# Patient Record
Sex: FEMALE | Race: WHITE | Employment: OTHER | URBAN - METROPOLITAN AREA
[De-identification: names, ages, dates, MRNs, and addresses within clinical notes are randomized per-mention and may not be internally consistent; named-entity substitution may affect disease eponyms.]

---

## 2017-10-03 PROBLEM — I65.23 OCCLUSION AND STENOSIS OF BILATERAL CAROTID ARTERIES: Status: ACTIVE | Noted: 2017-10-03

## 2017-10-03 PROBLEM — E78.5 HYPERLIPIDEMIA: Status: ACTIVE | Noted: 2017-10-03

## 2018-10-05 PROBLEM — I10 ELEVATED BLOOD PRESSURE READING IN OFFICE WITH DIAGNOSIS OF HYPERTENSION: Status: ACTIVE | Noted: 2018-10-05

## 2023-09-29 ENCOUNTER — INITIAL CONSULT (OUTPATIENT)
Dept: PAIN MANAGEMENT | Age: 68
End: 2023-09-29
Payer: MEDICARE

## 2023-09-29 VITALS — HEIGHT: 63 IN | BODY MASS INDEX: 20.91 KG/M2 | WEIGHT: 118 LBS

## 2023-09-29 DIAGNOSIS — M51.36 LUMBAR DEGENERATIVE DISC DISEASE: ICD-10-CM

## 2023-09-29 DIAGNOSIS — M25.551 BILATERAL HIP PAIN: ICD-10-CM

## 2023-09-29 DIAGNOSIS — M54.16 LUMBAR RADICULOPATHY: Primary | ICD-10-CM

## 2023-09-29 DIAGNOSIS — M25.552 BILATERAL HIP PAIN: ICD-10-CM

## 2023-09-29 DIAGNOSIS — M47.817 LUMBOSACRAL SPONDYLOSIS WITHOUT MYELOPATHY: ICD-10-CM

## 2023-09-29 PROCEDURE — G8427 DOCREV CUR MEDS BY ELIG CLIN: HCPCS | Performed by: STUDENT IN AN ORGANIZED HEALTH CARE EDUCATION/TRAINING PROGRAM

## 2023-09-29 PROCEDURE — 3017F COLORECTAL CA SCREEN DOC REV: CPT | Performed by: STUDENT IN AN ORGANIZED HEALTH CARE EDUCATION/TRAINING PROGRAM

## 2023-09-29 PROCEDURE — 1090F PRES/ABSN URINE INCON ASSESS: CPT | Performed by: STUDENT IN AN ORGANIZED HEALTH CARE EDUCATION/TRAINING PROGRAM

## 2023-09-29 PROCEDURE — 1123F ACP DISCUSS/DSCN MKR DOCD: CPT | Performed by: STUDENT IN AN ORGANIZED HEALTH CARE EDUCATION/TRAINING PROGRAM

## 2023-09-29 PROCEDURE — G8400 PT W/DXA NO RESULTS DOC: HCPCS | Performed by: STUDENT IN AN ORGANIZED HEALTH CARE EDUCATION/TRAINING PROGRAM

## 2023-09-29 PROCEDURE — 1036F TOBACCO NON-USER: CPT | Performed by: STUDENT IN AN ORGANIZED HEALTH CARE EDUCATION/TRAINING PROGRAM

## 2023-09-29 PROCEDURE — 99204 OFFICE O/P NEW MOD 45 MIN: CPT | Performed by: STUDENT IN AN ORGANIZED HEALTH CARE EDUCATION/TRAINING PROGRAM

## 2023-09-29 PROCEDURE — G8420 CALC BMI NORM PARAMETERS: HCPCS | Performed by: STUDENT IN AN ORGANIZED HEALTH CARE EDUCATION/TRAINING PROGRAM

## 2023-09-29 RX ORDER — SIMVASTATIN 20 MG
20 TABLET ORAL NIGHTLY
COMMUNITY
Start: 2023-01-09

## 2023-09-29 RX ORDER — ASPIRIN 81 MG/1
81 TABLET ORAL DAILY
COMMUNITY

## 2023-09-29 RX ORDER — IBUPROFEN 200 MG
200 TABLET ORAL EVERY 6 HOURS PRN
COMMUNITY

## 2023-09-29 RX ORDER — NAPROXEN 375 MG/1
TABLET ORAL
COMMUNITY
Start: 2023-07-10

## 2023-09-29 RX ORDER — LOSARTAN POTASSIUM AND HYDROCHLOROTHIAZIDE 12.5; 5 MG/1; MG/1
TABLET ORAL
COMMUNITY
Start: 2019-03-06

## 2023-09-29 RX ORDER — METOPROLOL SUCCINATE 25 MG/1
TABLET, EXTENDED RELEASE ORAL
COMMUNITY
Start: 2023-08-21

## 2023-09-29 RX ORDER — AMLODIPINE BESYLATE 5 MG/1
TABLET ORAL
COMMUNITY
Start: 2023-09-28

## 2023-09-29 NOTE — PROGRESS NOTES
therapy. Non-opioid and non-pharmacological opportunities to enhance analgesia and quality of life have been and will continue to be pursued.     Rojas Philip DO  Interventional Pain Management/PM&R   [address]    Orders Placed This Encounter    MRI LUMBAR SPINE WO CONTRAST     Standing Status:   Future     Standing Expiration Date:   9/29/2024    XR LUMBAR SPINE FLEXION AND EXTENSION ONLY     Standing Status:   Future     Standing Expiration Date:   9/29/2024     Order Specific Question:   Reason for exam:     Answer:   Lumbar radiculopathy    XR HIP BILATERAL W AP PELVIS (2 VIEWS)     Standing Status:   Future     Standing Expiration Date:   9/29/2024     Order Specific Question:   Reason for exam:     Answer:   hip pain    Calcium Carbonate-Vitamin D 500-1.25 MG-MCG CAPS     Sig: Take 1 tablet by mouth daily    amLODIPine (NORVASC) 5 MG tablet    aspirin 81 MG EC tablet     Sig: Take 1 tablet by mouth daily    ibuprofen (ADVIL;MOTRIN) 200 MG tablet     Sig: Take 1 tablet by mouth every 6 hours as needed    losartan-hydroCHLOROthiazide (HYZAAR) 50-12.5 MG per tablet     Sig: Take by mouth    metoprolol succinate (TOPROL XL) 25 MG extended release tablet    naproxen (NAPROSYN) 375 MG tablet    simvastatin (ZOCOR) 20 MG tablet     Sig: Take 1 tablet by mouth nightly

## 2023-10-19 ENCOUNTER — HOSPITAL ENCOUNTER (OUTPATIENT)
Age: 68
Discharge: HOME | End: 2023-10-21
Payer: MEDICARE

## 2023-10-19 ENCOUNTER — HOSPITAL ENCOUNTER (OUTPATIENT)
Dept: GENERAL RADIOLOGY | Age: 68
Discharge: HOME | End: 2023-10-21
Payer: MEDICARE

## 2023-10-19 ENCOUNTER — HOSPITAL ENCOUNTER (OUTPATIENT)
Dept: MRI IMAGING | Age: 68
Discharge: HOME | End: 2023-10-21
Payer: MEDICARE

## 2023-10-19 DIAGNOSIS — M54.16 LUMBAR RADICULOPATHY: ICD-10-CM

## 2023-10-19 DIAGNOSIS — M25.551 BILATERAL HIP PAIN: ICD-10-CM

## 2023-10-19 DIAGNOSIS — M25.552 BILATERAL HIP PAIN: ICD-10-CM

## 2023-10-19 PROCEDURE — 72120 X-RAY BEND ONLY L-S SPINE: CPT

## 2023-10-19 PROCEDURE — 72148 MRI LUMBAR SPINE W/O DYE: CPT

## 2023-10-19 PROCEDURE — 73521 X-RAY EXAM HIPS BI 2 VIEWS: CPT

## 2023-10-27 ENCOUNTER — OFFICE VISIT (OUTPATIENT)
Dept: PAIN MANAGEMENT | Age: 68
End: 2023-10-27
Payer: MEDICARE

## 2023-10-27 VITALS — BODY MASS INDEX: 20.91 KG/M2 | HEIGHT: 63 IN | WEIGHT: 118 LBS

## 2023-10-27 DIAGNOSIS — M25.552 BILATERAL HIP PAIN: ICD-10-CM

## 2023-10-27 DIAGNOSIS — M54.16 LUMBAR RADICULOPATHY: Primary | ICD-10-CM

## 2023-10-27 DIAGNOSIS — M25.551 BILATERAL HIP PAIN: ICD-10-CM

## 2023-10-27 DIAGNOSIS — M51.36 LUMBAR DEGENERATIVE DISC DISEASE: ICD-10-CM

## 2023-10-27 DIAGNOSIS — M47.817 LUMBOSACRAL SPONDYLOSIS WITHOUT MYELOPATHY: ICD-10-CM

## 2023-10-27 PROCEDURE — G8420 CALC BMI NORM PARAMETERS: HCPCS | Performed by: STUDENT IN AN ORGANIZED HEALTH CARE EDUCATION/TRAINING PROGRAM

## 2023-10-27 PROCEDURE — G8427 DOCREV CUR MEDS BY ELIG CLIN: HCPCS | Performed by: STUDENT IN AN ORGANIZED HEALTH CARE EDUCATION/TRAINING PROGRAM

## 2023-10-27 PROCEDURE — 3017F COLORECTAL CA SCREEN DOC REV: CPT | Performed by: STUDENT IN AN ORGANIZED HEALTH CARE EDUCATION/TRAINING PROGRAM

## 2023-10-27 PROCEDURE — G8400 PT W/DXA NO RESULTS DOC: HCPCS | Performed by: STUDENT IN AN ORGANIZED HEALTH CARE EDUCATION/TRAINING PROGRAM

## 2023-10-27 PROCEDURE — 1123F ACP DISCUSS/DSCN MKR DOCD: CPT | Performed by: STUDENT IN AN ORGANIZED HEALTH CARE EDUCATION/TRAINING PROGRAM

## 2023-10-27 PROCEDURE — G8484 FLU IMMUNIZE NO ADMIN: HCPCS | Performed by: STUDENT IN AN ORGANIZED HEALTH CARE EDUCATION/TRAINING PROGRAM

## 2023-10-27 PROCEDURE — 1090F PRES/ABSN URINE INCON ASSESS: CPT | Performed by: STUDENT IN AN ORGANIZED HEALTH CARE EDUCATION/TRAINING PROGRAM

## 2023-10-27 PROCEDURE — 1036F TOBACCO NON-USER: CPT | Performed by: STUDENT IN AN ORGANIZED HEALTH CARE EDUCATION/TRAINING PROGRAM

## 2023-10-27 PROCEDURE — 99214 OFFICE O/P EST MOD 30 MIN: CPT | Performed by: STUDENT IN AN ORGANIZED HEALTH CARE EDUCATION/TRAINING PROGRAM

## 2023-10-27 NOTE — PROGRESS NOTES
well as activity modification all within the last 6 weeks over 3 months. The patient's pain is moderate to severe that causes functional deficit measured on pain and disability scale. The patient reports worsening quality of life. PLAN:  Medications: For nonopioid therapy, the following medications were prescribed:    -Continue medication prescribed by primary care physician    Opioid therapy:  -Not indicated    Interventions:  -Plan for right lumbar L4 and L5 transforaminal epidural steroid injection  -Hold aspirin 7 days    Imaging:  -Reviewed lumbar x-ray and MRI with patient in room  -Reviewed bilateral hip x-ray with patient in room    Behavioral Therapies:  -Continue daily stretching and home exercise program    Referrals:  -None    Follow-up Plan:  -After procedure    Patient was offered intervention where appropriate. Multi-modal Pain Therapy: The patient was explicitly considered for multimodal and interdisciplinary therapy. Non-opioid and non-pharmacological opportunities to enhance analgesia and quality of life have been and will continue to be pursued.     Braeden Grande DO  Interventional Pain Management/PM&R   Replaced by [facility] [place]    Orders Placed This Encounter    TRANSFORMINAL LUMBAR SINGLE     Standing Status:   Future     Standing Expiration Date:   10/27/2024     Scheduling Instructions:      Right lumbar L4 and L5 transforaminal epidural steroid injection      Hold 7 days    TRANFORMINAL LUMBAR EACH ADDITIONAL     Standing Status:   Future     Standing Expiration Date:   10/27/2024

## 2023-10-27 NOTE — H&P (VIEW-ONLY)
Chronic Pain Clinic Note     Encounter Date: 10/27/2023     SUBJECTIVE:  Chief Complaint   Patient presents with    Back Pain       History of Present Illness:   Gwen Gracia is a 76 y.o. female who presents with Lumbar back pain     Medication Refill: N/A     Current Complaints of Pain:   Location: Lumbar Back Pain    Radiation: Right hip. Right groin   Severity: moderate   Pain Numerical Score - 5   Average: 7     Highest: 10  Lowest: 5  Character/Quality: Complains of pain that is sharp and throbbing  Timing: Keeps awake at night, Increases w/prolonged sitting/standing/walking, Constant  Associated symptoms: weakness   Numbness: na  Weakness: left leg, from previous stroke   Exacerbating factors: prolong walking, sitting, standing   Alleviating factors: advil, pain medication, lido patches   Length of time pain has been present: Started on 2020, had injections done lasted for 2 years   Inciting event/injury: no   Bowel/Bladder incontinence: no  Falls: no  Physical Therapy: 2020 PT    History of Interventions:   Surgery: No previous lumbar/cervical surgeries  Injections: Yes    Imaging:    10/21/23 MRI Lumbar W/O Contrast     FINDINGS:  BONES/ALIGNMENT: Vertebral body heights are maintained. There is a  hemangioma in the L1 vertebral body. There is degenerate endplate change at  C9-7. There is multilevel degenerative disc disease with loss of disc  signal.  There is no spondylolisthesis. SPINAL CORD: The conus terminates normally. SOFT TISSUES: No paraspinal mass identified. L1-L2: There is a circumferential disc bulge. There is no canal stenosis or  foraminal narrowing. L2-L3: There is a circumferential disc bulge. There is no canal stenosis or  foraminal narrowing. L3-L4: There is a circumferential disc bulge. There is no canal stenosis or  foraminal narrowing. L4-L5: There is a circumferential disc bulge with facet hypertrophy. There  is no canal stenosis.   There is moderate

## 2023-11-14 ENCOUNTER — HOSPITAL ENCOUNTER (OUTPATIENT)
Dept: PAIN MANAGEMENT | Facility: CLINIC | Age: 68
Discharge: HOME | End: 2023-11-14
Payer: MEDICARE

## 2023-11-14 VITALS
RESPIRATION RATE: 10 BRPM | WEIGHT: 113 LBS | HEIGHT: 63 IN | OXYGEN SATURATION: 94 % | TEMPERATURE: 97.8 F | SYSTOLIC BLOOD PRESSURE: 132 MMHG | HEART RATE: 73 BPM | BODY MASS INDEX: 20.02 KG/M2 | DIASTOLIC BLOOD PRESSURE: 80 MMHG

## 2023-11-14 DIAGNOSIS — R52 PAIN MANAGEMENT: ICD-10-CM

## 2023-11-14 PROCEDURE — 6360000004 HC RX CONTRAST MEDICATION: Performed by: STUDENT IN AN ORGANIZED HEALTH CARE EDUCATION/TRAINING PROGRAM

## 2023-11-14 PROCEDURE — 2500000003 HC RX 250 WO HCPCS: Performed by: STUDENT IN AN ORGANIZED HEALTH CARE EDUCATION/TRAINING PROGRAM

## 2023-11-14 PROCEDURE — 64484 NJX AA&/STRD TFRM EPI L/S EA: CPT

## 2023-11-14 PROCEDURE — 64484 NJX AA&/STRD TFRM EPI L/S EA: CPT | Performed by: STUDENT IN AN ORGANIZED HEALTH CARE EDUCATION/TRAINING PROGRAM

## 2023-11-14 PROCEDURE — 64483 NJX AA&/STRD TFRM EPI L/S 1: CPT

## 2023-11-14 PROCEDURE — 6360000002 HC RX W HCPCS: Performed by: STUDENT IN AN ORGANIZED HEALTH CARE EDUCATION/TRAINING PROGRAM

## 2023-11-14 PROCEDURE — 99152 MOD SED SAME PHYS/QHP 5/>YRS: CPT | Performed by: STUDENT IN AN ORGANIZED HEALTH CARE EDUCATION/TRAINING PROGRAM

## 2023-11-14 PROCEDURE — 64483 NJX AA&/STRD TFRM EPI L/S 1: CPT | Performed by: STUDENT IN AN ORGANIZED HEALTH CARE EDUCATION/TRAINING PROGRAM

## 2023-11-14 PROCEDURE — 2580000003 HC RX 258: Performed by: STUDENT IN AN ORGANIZED HEALTH CARE EDUCATION/TRAINING PROGRAM

## 2023-11-14 RX ORDER — SODIUM CHLORIDE 0.9 % (FLUSH) 0.9 %
SYRINGE (ML) INJECTION
Status: COMPLETED | OUTPATIENT
Start: 2023-11-14 | End: 2023-11-14

## 2023-11-14 RX ORDER — LIDOCAINE HYDROCHLORIDE 10 MG/ML
INJECTION, SOLUTION EPIDURAL; INFILTRATION; INTRACAUDAL; PERINEURAL
Status: COMPLETED | OUTPATIENT
Start: 2023-11-14 | End: 2023-11-14

## 2023-11-14 RX ORDER — MIDAZOLAM HYDROCHLORIDE 2 MG/2ML
INJECTION, SOLUTION INTRAMUSCULAR; INTRAVENOUS
Status: COMPLETED | OUTPATIENT
Start: 2023-11-14 | End: 2023-11-14

## 2023-11-14 RX ORDER — DEXAMETHASONE SODIUM PHOSPHATE 10 MG/ML
INJECTION, SOLUTION INTRAMUSCULAR; INTRAVENOUS
Status: COMPLETED | OUTPATIENT
Start: 2023-11-14 | End: 2023-11-14

## 2023-11-14 RX ADMIN — LIDOCAINE HYDROCHLORIDE 5 ML: 10 INJECTION, SOLUTION EPIDURAL; INFILTRATION; INTRACAUDAL at 10:06

## 2023-11-14 RX ADMIN — IOHEXOL 3 ML: 180 INJECTION INTRAVENOUS at 10:06

## 2023-11-14 RX ADMIN — DEXAMETHASONE SODIUM PHOSPHATE 20 MG: 10 INJECTION, SOLUTION INTRAMUSCULAR; INTRAVENOUS at 10:08

## 2023-11-14 RX ADMIN — Medication 4 ML: at 10:08

## 2023-11-14 RX ADMIN — MIDAZOLAM HYDROCHLORIDE 2 MG: 1 INJECTION, SOLUTION INTRAMUSCULAR; INTRAVENOUS at 10:06

## 2023-11-14 ASSESSMENT — PAIN SCALES - GENERAL
PAINLEVEL_OUTOF10: 0

## 2023-11-14 ASSESSMENT — PAIN DESCRIPTION - DESCRIPTORS: DESCRIPTORS: SHARP

## 2023-11-14 ASSESSMENT — PAIN - FUNCTIONAL ASSESSMENT: PAIN_FUNCTIONAL_ASSESSMENT: 0-10

## 2023-11-14 NOTE — OP NOTE
PROCEDURE PERFORMED: Right Lumbar Transforaminal Epidural Steroid Injection using Fluoroscopy    PREOPERATIVE DIAGNOSIS: Right lumbosacral radiculopathy    INDICATIONS: Right radicular leg pain    The patient's history and physical exam were reviewed. The risk, benefits, and alternatives of the procedure were discussed and all questions were answered to the patient's satisfaction. The patient agreed to proceed and written informed consent was obtained. POSTOPERATIVE DIAGNOSIS: Same    PHYSICIAN:  Dr. Dane Guerrero DO    ANESTHESIA:  LOCAL    ASSISTANT:  NONE    PATHOLOGY:  NONE    ESTIMATED BLOOD LOSS:  N/A    IMPLANTS:  NONE    PROCEDURE DESCRIPTION: Right lumbar transforaminal epidural injection using fluoroscopy    Targeted levels: Right lumbar L4 and L5 transforaminal epidural space    The patient was placed on the operative bed in prone position. The area was prepped with  Chlorhexidine. The area was then draped in a sterile fashion. An AP fluoroscopic  film was taken to identify the L5 vertebral body, pedicle and superior articulating process. The skin and subcutaneous tissues were anesthetized with 1% lidocaine. Then a 22-gauge 3-1/2 inch Quincke spinal needle was advanced under fluoroscopic guidance using an oblique view just inferior to the pedicle at L5. Then utilizing AP and lateral fluoroscopic views, I confirmed the position of the needle tip to be within the neural foramen. After negative aspiration, Omnipaque was injected under AP view at each level. There was adequate contrast spread along the nerve root and in the epidural space. There was no evidence of intravascular uptake or intrathecal spread in imaging. At this point, after negative aspiration, a total volume of treatment injectate consisting of 1 mL of dexamethasone 10 mg/mL and 2 mL of normal saline preservative-free was easily injected. The needle was then removed.   There was applied pressure to the needle insertion

## 2023-11-14 NOTE — DISCHARGE INSTRUCTIONS

## 2023-12-01 ENCOUNTER — OFFICE VISIT (OUTPATIENT)
Dept: PAIN MANAGEMENT | Age: 68
End: 2023-12-01

## 2023-12-01 VITALS — WEIGHT: 113 LBS | HEIGHT: 63 IN | BODY MASS INDEX: 20.02 KG/M2

## 2023-12-01 DIAGNOSIS — M70.61 GREATER TROCHANTERIC BURSITIS OF RIGHT HIP: Primary | ICD-10-CM

## 2023-12-01 DIAGNOSIS — M47.817 LUMBOSACRAL SPONDYLOSIS WITHOUT MYELOPATHY: ICD-10-CM

## 2023-12-01 DIAGNOSIS — M51.36 LUMBAR DEGENERATIVE DISC DISEASE: ICD-10-CM

## 2023-12-01 DIAGNOSIS — M54.16 LUMBAR RADICULOPATHY: ICD-10-CM

## 2023-12-01 RX ORDER — BUPIVACAINE HYDROCHLORIDE 2.5 MG/ML
4 INJECTION, SOLUTION INFILTRATION; PERINEURAL ONCE
Status: COMPLETED | OUTPATIENT
Start: 2023-12-01 | End: 2023-12-01

## 2023-12-01 RX ORDER — TRIAMCINOLONE ACETONIDE 40 MG/ML
40 INJECTION, SUSPENSION INTRA-ARTICULAR; INTRAMUSCULAR ONCE
Status: COMPLETED | OUTPATIENT
Start: 2023-12-01 | End: 2023-12-01

## 2023-12-01 RX ADMIN — BUPIVACAINE HYDROCHLORIDE 4 ML: 2.5 INJECTION, SOLUTION INFILTRATION; PERINEURAL at 12:27

## 2023-12-01 RX ADMIN — TRIAMCINOLONE ACETONIDE 40 MG: 40 INJECTION, SUSPENSION INTRA-ARTICULAR; INTRAMUSCULAR at 12:28

## 2023-12-01 NOTE — PROGRESS NOTES
Chronic Pain Clinic Note     Encounter Date: 12/1/2023     SUBJECTIVE:  No chief complaint on file. History of Present Illness:   Maribel Salvador is a 76 y.o. female who presents with Lumbar back pain     Medication Refill: N/A     Current Complaints of Pain:   Location: Lumbar Back Pain    Radiation: Right hip. Right groin   Severity: moderate   Pain Numerical Score - 5   Average: 7     Highest: 10  Lowest: 5  Character/Quality: Complains of pain that is sharp and throbbing  Timing: Keeps awake at night, Increases w/prolonged sitting/standing/walking, Constant  Associated symptoms: weakness   Numbness: na  Weakness: left leg, from previous stroke   Exacerbating factors: prolong walking, sitting, standing   Alleviating factors: advil, pain medication, lido patches   Length of time pain has been present: Started on 2020, had injections done lasted for 2 years   Inciting event/injury: no   Bowel/Bladder incontinence: no  Falls: no  Physical Therapy: 2020 PT    History of Interventions:   Surgery: No previous lumbar/cervical surgeries  Injections: Yes    Imaging:    10/21/23 MRI Lumbar W/O Contrast     FINDINGS:  BONES/ALIGNMENT: Vertebral body heights are maintained. There is a  hemangioma in the L1 vertebral body. There is degenerate endplate change at  V2-0. There is multilevel degenerative disc disease with loss of disc  signal.  There is no spondylolisthesis. SPINAL CORD: The conus terminates normally. SOFT TISSUES: No paraspinal mass identified. L1-L2: There is a circumferential disc bulge. There is no canal stenosis or  foraminal narrowing. L2-L3: There is a circumferential disc bulge. There is no canal stenosis or  foraminal narrowing. L3-L4: There is a circumferential disc bulge. There is no canal stenosis or  foraminal narrowing. L4-L5: There is a circumferential disc bulge with facet hypertrophy. There  is no canal stenosis. There is moderate foraminal narrowing.      L5-S1:

## 2023-12-01 NOTE — PROGRESS NOTES
Procedure performed: Right greater trochanteric bursa injections    Indications: Right lateral hip pain    Risks, benefits, and alternatives of the procedure were reviewed. All questions were answered appropriately and informed consent, both written and verbal, was obtained. The patient was placed in the standing position. A timeout was obtained. Landmarks were palpated. The lateral hip was palpated until the greater trochanter was marked. The area was prepped with alcohol x3 in typical aseptic fashion. At this point, a 25-gauge 3.5\" Quincke needle was inserted into the lateral hip until bone was contacted and then slightly withdrawn. After negative aspiration, A total of 4 mL of bupivacaine 0.25% combined with 40 mg of Kenalog was slowly injected into the lateral hip. The needle was withdrawn with the point of needle intact. The procedure was tolerated without sequelae. The patient was kept in the office for approximately 10 minutes and left without signs of any immediate sequelae.

## 2024-03-22 ENCOUNTER — OFFICE VISIT (OUTPATIENT)
Dept: PAIN MANAGEMENT | Age: 69
End: 2024-03-22
Payer: MEDICARE

## 2024-03-22 VITALS — BODY MASS INDEX: 20.02 KG/M2 | WEIGHT: 113 LBS | HEIGHT: 63 IN

## 2024-03-22 DIAGNOSIS — M47.817 LUMBOSACRAL SPONDYLOSIS WITHOUT MYELOPATHY: ICD-10-CM

## 2024-03-22 DIAGNOSIS — M54.16 LUMBAR RADICULOPATHY: Primary | ICD-10-CM

## 2024-03-22 DIAGNOSIS — M51.36 LUMBAR DEGENERATIVE DISC DISEASE: ICD-10-CM

## 2024-03-22 DIAGNOSIS — M70.61 GREATER TROCHANTERIC BURSITIS OF RIGHT HIP: ICD-10-CM

## 2024-03-22 PROCEDURE — 1036F TOBACCO NON-USER: CPT | Performed by: STUDENT IN AN ORGANIZED HEALTH CARE EDUCATION/TRAINING PROGRAM

## 2024-03-22 PROCEDURE — 1090F PRES/ABSN URINE INCON ASSESS: CPT | Performed by: STUDENT IN AN ORGANIZED HEALTH CARE EDUCATION/TRAINING PROGRAM

## 2024-03-22 PROCEDURE — 1123F ACP DISCUSS/DSCN MKR DOCD: CPT | Performed by: STUDENT IN AN ORGANIZED HEALTH CARE EDUCATION/TRAINING PROGRAM

## 2024-03-22 PROCEDURE — G8427 DOCREV CUR MEDS BY ELIG CLIN: HCPCS | Performed by: STUDENT IN AN ORGANIZED HEALTH CARE EDUCATION/TRAINING PROGRAM

## 2024-03-22 PROCEDURE — 99214 OFFICE O/P EST MOD 30 MIN: CPT | Performed by: STUDENT IN AN ORGANIZED HEALTH CARE EDUCATION/TRAINING PROGRAM

## 2024-03-22 PROCEDURE — G8420 CALC BMI NORM PARAMETERS: HCPCS | Performed by: STUDENT IN AN ORGANIZED HEALTH CARE EDUCATION/TRAINING PROGRAM

## 2024-03-22 PROCEDURE — 3017F COLORECTAL CA SCREEN DOC REV: CPT | Performed by: STUDENT IN AN ORGANIZED HEALTH CARE EDUCATION/TRAINING PROGRAM

## 2024-03-22 PROCEDURE — G8400 PT W/DXA NO RESULTS DOC: HCPCS | Performed by: STUDENT IN AN ORGANIZED HEALTH CARE EDUCATION/TRAINING PROGRAM

## 2024-03-22 PROCEDURE — G8484 FLU IMMUNIZE NO ADMIN: HCPCS | Performed by: STUDENT IN AN ORGANIZED HEALTH CARE EDUCATION/TRAINING PROGRAM

## 2024-03-22 RX ORDER — METHYLPREDNISOLONE 4 MG/1
TABLET ORAL
Qty: 1 KIT | Refills: 0 | Status: SHIPPED | OUTPATIENT
Start: 2024-03-22

## 2024-03-22 NOTE — H&P (VIEW-ONLY)
Chronic Pain Clinic Note     Encounter Date: 3/22/2024     SUBJECTIVE:  Chief Complaint   Patient presents with    Back Pain       History of Present Illness:   Kalee Padilla is a 68 y.o. female who presents with low back pain    Medication Refill: N/A     Current Complaints of Pain:   Location: Low Back Pain    Radiation: Right leg  Severity: moderate   Pain Numerical Score - 8   Average: 7     Highest: 10  Lowest: 5  Character/Quality: Complains of pain that is sharp and throbbing  Timing: Keeps awake at night, Increases w/prolonged sitting/standing/walking, Constant  Associated symptoms: weakness   Numbness: na  Weakness: left leg, from previous stroke   Exacerbating factors: prolong walking, sitting, standing   Alleviating factors: advil, pain medication, lido patches   Length of time pain has been present: Started on 2020, had injections done lasted for 2 years   Inciting event/injury: no   Bowel/Bladder incontinence: no  Falls: no  Physical Therapy: 2020 PT    History of Interventions:   Surgery: No previous lumbar/cervical surgeries  Injections: Yes    Imaging:    10/21/23 MRI Lumbar W/O Contrast     FINDINGS:  BONES/ALIGNMENT: Vertebral body heights are maintained.  There is a  hemangioma in the L1 vertebral body.  There is degenerate endplate change at  L4-5.  There is multilevel degenerative disc disease with loss of disc  signal.  There is no spondylolisthesis.     SPINAL CORD: The conus terminates normally.     SOFT TISSUES: No paraspinal mass identified.     L1-L2: There is a circumferential disc bulge.  There is no canal stenosis or  foraminal narrowing.     L2-L3: There is a circumferential disc bulge.  There is no canal stenosis or  foraminal narrowing.     L3-L4: There is a circumferential disc bulge.  There is no canal stenosis or  foraminal narrowing.     L4-L5: There is a circumferential disc bulge with facet hypertrophy.  There  is no canal stenosis.  There is moderate foraminal narrowing.

## 2024-03-22 NOTE — PROGRESS NOTES
failed conservative measures including outpatient physical therapy, greater than 3 medications for pain relief, a self-directed therapy program, as well as activity modification all within the last 6 weeks over 3 months. The patient's pain is moderate to severe that causes functional deficit measured on pain and disability scale. The patient reports worsening quality of life.    PLAN:  Medications:    For nonopioid therapy, the following medications were prescribed:    -Continue medication prescribed by primary care physician    Opioid therapy:  -Not indicated    Interventions:  -Plan for right lumbar L4 and L5 transforaminal epidural steroid injection  -Status post right greater trochanteric bursa injection on 12/1/2023   -Status post right lumbar L4 and L5 transforaminal epidural steroid injection on 11/14/2023 with 50% improvement in pain and function  -Hold aspirin 7 days    Imaging:  -No new imaging    Behavioral Therapies:  -Continue daily stretching and home exercise program    Referrals:  -None    Follow-up Plan:  -After procedure    Patient was offered intervention where appropriate.     Multi-modal Pain Therapy:  The patient was explicitly considered for multimodal and interdisciplinary therapy. Non-opioid and non-pharmacological opportunities to enhance analgesia and quality of life have been and will continue to be pursued.    Phil Caba DO  Interventional Pain Management/PM&R   [facility]    Orders Placed This Encounter    TRANSFORMINAL LUMBAR SINGLE     Standing Status:   Future     Standing Expiration Date:   3/22/2025     Scheduling Instructions:      Right lumbar L4 and L5 tansforaminal epidural steroid injection      Hold ASA 7 days    TRANFORMINAL LUMBAR EACH ADDITIONAL     Standing Status:   Future     Standing Expiration Date:   3/22/2025    methylPREDNISolone (MEDROL DOSEPACK) 4 MG tablet     Sig: Take by mouth.     Dispense:  1 kit     Refill:  0

## 2024-04-02 ENCOUNTER — HOSPITAL ENCOUNTER (OUTPATIENT)
Dept: PAIN MANAGEMENT | Facility: CLINIC | Age: 69
Discharge: HOME | End: 2024-04-02
Payer: MEDICARE

## 2024-04-02 VITALS
BODY MASS INDEX: 20.02 KG/M2 | HEIGHT: 63 IN | RESPIRATION RATE: 10 BRPM | SYSTOLIC BLOOD PRESSURE: 121 MMHG | TEMPERATURE: 98.4 F | DIASTOLIC BLOOD PRESSURE: 76 MMHG | WEIGHT: 113 LBS | OXYGEN SATURATION: 97 % | HEART RATE: 67 BPM

## 2024-04-02 DIAGNOSIS — R52 PAIN MANAGEMENT: ICD-10-CM

## 2024-04-02 PROCEDURE — 2580000003 HC RX 258: Performed by: STUDENT IN AN ORGANIZED HEALTH CARE EDUCATION/TRAINING PROGRAM

## 2024-04-02 PROCEDURE — 64483 NJX AA&/STRD TFRM EPI L/S 1: CPT | Performed by: STUDENT IN AN ORGANIZED HEALTH CARE EDUCATION/TRAINING PROGRAM

## 2024-04-02 PROCEDURE — 99152 MOD SED SAME PHYS/QHP 5/>YRS: CPT | Performed by: STUDENT IN AN ORGANIZED HEALTH CARE EDUCATION/TRAINING PROGRAM

## 2024-04-02 PROCEDURE — 64483 NJX AA&/STRD TFRM EPI L/S 1: CPT

## 2024-04-02 PROCEDURE — 64484 NJX AA&/STRD TFRM EPI L/S EA: CPT

## 2024-04-02 PROCEDURE — 64484 NJX AA&/STRD TFRM EPI L/S EA: CPT | Performed by: STUDENT IN AN ORGANIZED HEALTH CARE EDUCATION/TRAINING PROGRAM

## 2024-04-02 PROCEDURE — 6360000002 HC RX W HCPCS: Performed by: STUDENT IN AN ORGANIZED HEALTH CARE EDUCATION/TRAINING PROGRAM

## 2024-04-02 PROCEDURE — 6360000004 HC RX CONTRAST MEDICATION: Performed by: STUDENT IN AN ORGANIZED HEALTH CARE EDUCATION/TRAINING PROGRAM

## 2024-04-02 PROCEDURE — 2500000003 HC RX 250 WO HCPCS: Performed by: STUDENT IN AN ORGANIZED HEALTH CARE EDUCATION/TRAINING PROGRAM

## 2024-04-02 RX ORDER — SODIUM CHLORIDE 0.9 % (FLUSH) 0.9 %
SYRINGE (ML) INJECTION
Status: COMPLETED | OUTPATIENT
Start: 2024-04-02 | End: 2024-04-02

## 2024-04-02 RX ORDER — DEXAMETHASONE SODIUM PHOSPHATE 10 MG/ML
INJECTION, SOLUTION INTRAMUSCULAR; INTRAVENOUS
Status: COMPLETED | OUTPATIENT
Start: 2024-04-02 | End: 2024-04-02

## 2024-04-02 RX ORDER — LIDOCAINE HYDROCHLORIDE 10 MG/ML
INJECTION, SOLUTION EPIDURAL; INFILTRATION; INTRACAUDAL; PERINEURAL
Status: COMPLETED | OUTPATIENT
Start: 2024-04-02 | End: 2024-04-02

## 2024-04-02 RX ORDER — MIDAZOLAM HYDROCHLORIDE 2 MG/2ML
INJECTION, SOLUTION INTRAMUSCULAR; INTRAVENOUS
Status: COMPLETED | OUTPATIENT
Start: 2024-04-02 | End: 2024-04-02

## 2024-04-02 RX ADMIN — LIDOCAINE HYDROCHLORIDE 5 ML: 10 INJECTION, SOLUTION EPIDURAL; INFILTRATION; INTRACAUDAL at 11:56

## 2024-04-02 RX ADMIN — Medication 4 ML: at 11:55

## 2024-04-02 RX ADMIN — MIDAZOLAM HYDROCHLORIDE 2 MG: 1 INJECTION, SOLUTION INTRAMUSCULAR; INTRAVENOUS at 11:53

## 2024-04-02 RX ADMIN — IOHEXOL 1 ML: 180 INJECTION INTRAVENOUS at 11:54

## 2024-04-02 RX ADMIN — DEXAMETHASONE SODIUM PHOSPHATE 20 MG: 10 INJECTION, SOLUTION INTRAMUSCULAR; INTRAVENOUS at 11:55

## 2024-04-02 RX ADMIN — IOHEXOL 1 ML: 180 INJECTION INTRAVENOUS at 11:56

## 2024-04-02 ASSESSMENT — PAIN DESCRIPTION - DESCRIPTORS: DESCRIPTORS: SHARP

## 2024-04-02 ASSESSMENT — PAIN - FUNCTIONAL ASSESSMENT
PAIN_FUNCTIONAL_ASSESSMENT: 0-10
PAIN_FUNCTIONAL_ASSESSMENT: 0-10
PAIN_FUNCTIONAL_ASSESSMENT: PREVENTS OR INTERFERES WITH ALL ACTIVE AND SOME PASSIVE ACTIVITIES

## 2024-04-02 NOTE — INTERVAL H&P NOTE
Update History & Physical    The patient's History and Physical of March 22, 2024 was reviewed with the patient and I examined the patient. There was no change. The surgical site was confirmed by the patient and me.     Plan: The risks, benefits, expected outcome, and alternative to the recommended procedure have been discussed with the patient. Patient understands and wants to proceed with the procedure.     ASA CLASSIFICATION  2  MP   CLASSIFICATION  2    Electronically signed by Phil Caba DO on 4/2/2024 at 11:39 AM

## 2024-04-02 NOTE — DISCHARGE INSTRUCTIONS

## 2024-04-02 NOTE — OP NOTE
PROCEDURE PERFORMED: Right Lumbar Transforaminal Epidural Steroid Injection using Fluoroscopy    PREOPERATIVE DIAGNOSIS: Right lumbosacral radiculopathy    INDICATIONS: Right radicular leg pain    The patient's history and physical exam were reviewed.  The risk, benefits, and alternatives of the procedure were discussed and all questions were answered to the patient's satisfaction.  The patient agreed to proceed and written informed consent was obtained.    POSTOPERATIVE DIAGNOSIS: Same    PHYSICIAN:  Dr. Phil Caba DO    ANESTHESIA:  LOCAL    ASSISTANT:  NONE    PATHOLOGY:  NONE    ESTIMATED BLOOD LOSS:  N/A    IMPLANTS:  NONE    PROCEDURE DESCRIPTION: Right lumbar transforaminal epidural injection using fluoroscopy    Targeted levels: Right lumbar L4 and L5 transforaminal epidural space    The patient was placed on the operative bed in prone position.  The area was prepped with  Chlorhexidine.  The area was then draped in a sterile fashion.  An AP fluoroscopic  film was taken to identify the L5 vertebral body, pedicle and superior articulating process.  The skin and subcutaneous tissues were anesthetized with 1% lidocaine.  Then a 22-gauge 3-1/2 inch Quincke spinal needle was advanced under fluoroscopic guidance using an oblique view just inferior to the pedicle at L5.  Then utilizing AP and lateral fluoroscopic views, I confirmed the position of the needle tip to be within the neural foramen.  After negative aspiration, Omnipaque was injected under AP view at each level.  There was adequate contrast spread along the nerve root and in the epidural space.  There was no evidence of intravascular uptake or intrathecal spread in imaging.  At this point, after negative aspiration, a total volume of treatment injectate consisting of 1 mL of dexamethasone 10 mg/mL and 2 mL of normal saline preservative-free was easily injected.  The needle was then removed.  There was applied pressure to the needle insertion

## 2024-04-08 ENCOUNTER — TELEPHONE (OUTPATIENT)
Dept: PAIN MANAGEMENT | Age: 69
End: 2024-04-08

## 2024-04-08 NOTE — TELEPHONE ENCOUNTER
Patient called in regards to back pain since Friday. Patient had a Right Lumbar Transforaminal Epidural Steroid Injection done on 4/2/2024. Patient states when they walk, sit or stand pain increases in the back and right hip, patient has been putting ice and taking advil every 4 hours to help with the pain, and it is not helping. Please advise.

## 2024-04-26 ENCOUNTER — OFFICE VISIT (OUTPATIENT)
Dept: PAIN MANAGEMENT | Age: 69
End: 2024-04-26
Payer: MEDICARE

## 2024-04-26 VITALS — BODY MASS INDEX: 20.02 KG/M2 | WEIGHT: 113 LBS | HEIGHT: 63 IN

## 2024-04-26 DIAGNOSIS — M25.552 BILATERAL HIP PAIN: ICD-10-CM

## 2024-04-26 DIAGNOSIS — M47.817 LUMBOSACRAL SPONDYLOSIS WITHOUT MYELOPATHY: ICD-10-CM

## 2024-04-26 DIAGNOSIS — M54.16 LUMBAR RADICULOPATHY: Primary | ICD-10-CM

## 2024-04-26 DIAGNOSIS — M25.551 BILATERAL HIP PAIN: ICD-10-CM

## 2024-04-26 DIAGNOSIS — M51.36 LUMBAR DEGENERATIVE DISC DISEASE: ICD-10-CM

## 2024-04-26 PROCEDURE — 1090F PRES/ABSN URINE INCON ASSESS: CPT | Performed by: STUDENT IN AN ORGANIZED HEALTH CARE EDUCATION/TRAINING PROGRAM

## 2024-04-26 PROCEDURE — G8420 CALC BMI NORM PARAMETERS: HCPCS | Performed by: STUDENT IN AN ORGANIZED HEALTH CARE EDUCATION/TRAINING PROGRAM

## 2024-04-26 PROCEDURE — 1036F TOBACCO NON-USER: CPT | Performed by: STUDENT IN AN ORGANIZED HEALTH CARE EDUCATION/TRAINING PROGRAM

## 2024-04-26 PROCEDURE — 99213 OFFICE O/P EST LOW 20 MIN: CPT | Performed by: STUDENT IN AN ORGANIZED HEALTH CARE EDUCATION/TRAINING PROGRAM

## 2024-04-26 PROCEDURE — G8400 PT W/DXA NO RESULTS DOC: HCPCS | Performed by: STUDENT IN AN ORGANIZED HEALTH CARE EDUCATION/TRAINING PROGRAM

## 2024-04-26 PROCEDURE — 3017F COLORECTAL CA SCREEN DOC REV: CPT | Performed by: STUDENT IN AN ORGANIZED HEALTH CARE EDUCATION/TRAINING PROGRAM

## 2024-04-26 PROCEDURE — 1123F ACP DISCUSS/DSCN MKR DOCD: CPT | Performed by: STUDENT IN AN ORGANIZED HEALTH CARE EDUCATION/TRAINING PROGRAM

## 2024-04-26 PROCEDURE — G8427 DOCREV CUR MEDS BY ELIG CLIN: HCPCS | Performed by: STUDENT IN AN ORGANIZED HEALTH CARE EDUCATION/TRAINING PROGRAM

## 2024-04-26 NOTE — PROGRESS NOTES
Chronic Pain Clinic Note     Encounter Date: 4/26/2024     SUBJECTIVE:  Chief Complaint   Patient presents with    Back Pain       History of Present Illness:   Kalee Padilla is a 68 y.o. female who presents with low back pain    Medication Refill: N/A     Current Complaints of Pain:   Location: Low Back Pain    Radiation: Right leg  Severity: moderate   Pain Numerical Score - 9 today    Average: 7     Highest: 10  Lowest: 5  Character/Quality: Complains of pain that is sharp and throbbing  Timing: Keeps awake at night, Increases w/prolonged sitting/standing/walking, Constant  Associated symptoms: weakness   Numbness: na  Weakness: left leg, from previous stroke   Exacerbating factors: prolong walking, sitting, standing   Alleviating factors: advil, pain medication, lido patches   Length of time pain has been present: Started on 2020, had injections done lasted for 2 years   Inciting event/injury: no   Bowel/Bladder incontinence: no  Falls: no  Physical Therapy: 2020 PT    History of Interventions:   Surgery: No previous lumbar/cervical surgeries  Injections: TF LESI 04/02/2024 - pt states 1% pain relief    Imaging:    10/21/23 MRI Lumbar W/O Contrast     FINDINGS:  BONES/ALIGNMENT: Vertebral body heights are maintained.  There is a  hemangioma in the L1 vertebral body.  There is degenerate endplate change at  L4-5.  There is multilevel degenerative disc disease with loss of disc  signal.  There is no spondylolisthesis.     SPINAL CORD: The conus terminates normally.     SOFT TISSUES: No paraspinal mass identified.     L1-L2: There is a circumferential disc bulge.  There is no canal stenosis or  foraminal narrowing.     L2-L3: There is a circumferential disc bulge.  There is no canal stenosis or  foraminal narrowing.     L3-L4: There is a circumferential disc bulge.  There is no canal stenosis or  foraminal narrowing.     L4-L5: There is a circumferential disc bulge with facet hypertrophy.  There  is no canal

## 2025-05-30 ENCOUNTER — HOSPITAL ENCOUNTER (EMERGENCY)
Facility: CLINIC | Age: 70
Discharge: HOME | End: 2025-05-30
Attending: EMERGENCY MEDICINE
Payer: MEDICARE

## 2025-05-30 ENCOUNTER — APPOINTMENT (OUTPATIENT)
Dept: GENERAL RADIOLOGY | Facility: CLINIC | Age: 70
End: 2025-05-30
Payer: MEDICARE

## 2025-05-30 VITALS
HEIGHT: 63 IN | HEART RATE: 75 BPM | SYSTOLIC BLOOD PRESSURE: 174 MMHG | DIASTOLIC BLOOD PRESSURE: 114 MMHG | WEIGHT: 120 LBS | TEMPERATURE: 98.1 F | BODY MASS INDEX: 21.26 KG/M2 | OXYGEN SATURATION: 96 % | RESPIRATION RATE: 18 BRPM

## 2025-05-30 DIAGNOSIS — S80.211A ABRASION OF RIGHT KNEE, INITIAL ENCOUNTER: ICD-10-CM
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
## 2025-05-30 DIAGNOSIS — M70.61 TROCHANTERIC BURSITIS OF RIGHT HIP: ICD-10-CM

## 2025-05-30 DIAGNOSIS — M54.50 LOW BACK PAIN, UNSPECIFIED BACK PAIN LATERALITY, UNSPECIFIED CHRONICITY, UNSPECIFIED WHETHER SCIATICA PRESENT: ICD-10-CM

## 2025-05-30 PROCEDURE — 6370000000 HC RX 637 (ALT 250 FOR IP)

## 2025-05-30 PROCEDURE — 73502 X-RAY EXAM HIP UNI 2-3 VIEWS: CPT

## 2025-05-30 PROCEDURE — 72100 X-RAY EXAM L-S SPINE 2/3 VWS: CPT

## 2025-05-30 PROCEDURE — 99283 EMERGENCY DEPT VISIT LOW MDM: CPT

## 2025-05-30 RX ORDER — ROSUVASTATIN CALCIUM 5 MG/1
5 TABLET, COATED ORAL DAILY
COMMUNITY

## 2025-05-30 RX ORDER — TRAMADOL HYDROCHLORIDE 50 MG/1
50 TABLET ORAL EVERY 8 HOURS PRN
Qty: 15 TABLET | Refills: 0 | Status: SHIPPED | OUTPATIENT
Start: 2025-05-30 | End: 2025-06-04

## 2025-05-30 RX ORDER — HYDROCODONE BITARTRATE AND ACETAMINOPHEN 5; 325 MG/1; MG/1
1 TABLET ORAL ONCE
Status: COMPLETED | OUTPATIENT
Start: 2025-05-30 | End: 2025-05-30

## 2025-05-30 RX ORDER — HYDROCODONE BITARTRATE AND ACETAMINOPHEN 5; 325 MG/1; MG/1
TABLET ORAL
Status: DISCONTINUED
Start: 2025-05-30 | End: 2025-05-30 | Stop reason: HOSPADM

## 2025-05-30 RX ORDER — LIDOCAINE 4 G/G
PATCH TOPICAL
Status: DISCONTINUED
Start: 2025-05-30 | End: 2025-05-30 | Stop reason: HOSPADM

## 2025-05-30 RX ORDER — MELOXICAM 15 MG/1
15 TABLET ORAL DAILY
COMMUNITY

## 2025-05-30 RX ORDER — LIDOCAINE 4 G/G
1 PATCH TOPICAL ONCE
Status: DISCONTINUED | OUTPATIENT
Start: 2025-05-30 | End: 2025-05-30

## 2025-05-30 RX ADMIN — HYDROCODONE BITARTRATE AND ACETAMINOPHEN 1 TABLET: 5; 325 TABLET ORAL at 11:53

## 2025-05-30 ASSESSMENT — ENCOUNTER SYMPTOMS
VOMITING: 0
ABDOMINAL PAIN: 0
COLOR CHANGE: 0
SHORTNESS OF BREATH: 0
PHOTOPHOBIA: 0
BACK PAIN: 1
NAUSEA: 0
DIARRHEA: 0

## 2025-05-30 ASSESSMENT — PAIN DESCRIPTION - ORIENTATION: ORIENTATION: RIGHT

## 2025-05-30 ASSESSMENT — PAIN - FUNCTIONAL ASSESSMENT: PAIN_FUNCTIONAL_ASSESSMENT: 0-10

## 2025-05-30 ASSESSMENT — PAIN SCALES - GENERAL: PAINLEVEL_OUTOF10: 9

## 2025-05-30 ASSESSMENT — PAIN DESCRIPTION - LOCATION: LOCATION: HIP;BACK;KNEE

## 2025-05-30 ASSESSMENT — PAIN DESCRIPTION - DESCRIPTORS: DESCRIPTORS: SHARP;SHOOTING;ACHING

## 2025-05-30 ASSESSMENT — PAIN DESCRIPTION - PAIN TYPE: TYPE: ACUTE PAIN

## 2025-05-30 ASSESSMENT — PAIN DESCRIPTION - FREQUENCY: FREQUENCY: CONTINUOUS

## 2025-05-30 NOTE — DISCHARGE INSTRUCTIONS
You were seen today for back pain and hip pain.    Continue meloxicam as prescribed by your primary care doctor.    Take tylenol 650 mg- 1,000 mg every 6-8 hours as needed.    You may also take tramadol as needed for severe pain.  Please note that this may make you drowsy, so please do not drive or operate heavy machinery until you know how this medication may affect you.     Apply diclofenac gel to affected area up to four times daily as needed for pain.    It is important with lower back pain that you continue with daily activities.  Bed rest can worsen symptoms and lead to long term complications.  Make sure to stretch daily.  Avoid lifting heavy items > 20 pounds until pain improves.  Wearing a back brace in the future may help prevent future injuries.     Please call Ubaldo Flores MD on the next business day for follow-up within 1 week.     Go to your scheduled appointment with orthopedic surgery/specialist on June 3rd, Tuesday, at 9:30 am for further evaluation and treatment recommendations.    Return to the ED if you develop severe worsening of pain, inability to walk, inability to control bowel or bladder, high fevers, or any other concerns arise.

## 2025-05-30 NOTE — ED PROVIDER NOTES
[facility]  [address] [address]  Phone: [phone]        Pt Name: Kalee Padilla  MRN: [id number]  Birthdate 1955  Date of evaluation: 5/30/25    CHIEFCOMPLAINT       Chief Complaint   Patient presents with    Fall     Right lower back, right hip and right knee pain, tripped on some bricks yesterday at 4P, no hitting, no LOC       HISTORY OF PRESENT ILLNESS (Location/Symptom, Timing/Onset, Context/Setting, Quality, Duration, Modifying Factors, Severity)      Kalee Padilla is a 69 y.o. female with no pertinent PMH who presents to the ED via private auto with complaint of a misstep on her outdoor stairs yesterday evening around 4 PM.  Patient states she came down on her right knee and she felt like her right leg jammed into her right hip which she currently is treating for trochanteric bursitis.  Patient is under treatment with her primary care physician for this.  Takes Mobic for her trochanteric bursitis.  She did not hit her head, no LOC.  Had severe pain overnight, Tylenol and Mobic not helping.  No numbness, tingling or weakness to her arms or legs.  No loss of bowel or bladder control.  No hematuria.  Pain worse with bearing weight and ambulation.  9 out of 10.  No previous injury or surgery to her right hip.  States that she was told by her PCP that she had \"bone-on-bone and would likely need a hip replacement.    PAST MEDICAL / SURGICAL / SOCIAL / FAMILY HISTORY     PMH:  has a past medical history of Arthritis, CAD (coronary artery disease), GERD (gastroesophageal reflux disease), Hypertension, Pancreatitis, Pulmonary hypertension (HCC), Spastic colon, and Wears glasses.  Surgical History:  has a past surgical history that includes Cholecystectomy; Upper gastrointestinal endoscopy; Colonoscopy; Foot surgery (Bilateral, x 4 each foot); and Pain management procedure.  Social History:  reports that she has never smoked. She has never used smokeless tobacco. She

## 2025-05-30 NOTE — ED NOTES
Mode of arrival (squad #, walk in, police, etc) : walk in, from home        Chief complaint(s): fall, right lower back, hip and knee pain        Arrival Note (brief scenario, treatment PTA, etc).: Pt presented to the ED c/o right lower back, hip and knee pain after tripping and falling on the concrete yesterday around 1600. Denies hitting head, denies LOC. Reports she has been taking meloxicam and tylenol at home and elevating her right extremity. Her last dose of 1 g tylenol was this morning around 0700. Pt alert and oriented, having unsteady gait.         C= \"Have you ever felt that you should Cut down on your drinking?\"  No  A= \"Have people Annoyed you by criticizing your drinking?\"  No  G= \"Have you ever felt bad or Guilty about your drinking?\"  No  E= \"Have you ever had a drink as an Eye-opener first thing in the morning to steady your nerves or to help a hangover?\"  No      Deferred []      Reason for deferring: N/A    *If yes to two or more: probable alcohol abuse.*

## 2025-05-30 NOTE — ED PROVIDER NOTES
eMERGENCY dEPARTMENT  Attending Physician Attestation     Pt Name: Kalee Padilla  MRN: [id number]  Birthdate 1955  Date of evaluation: 5/30/25     Kalee Padilla is a 69 y.o. female with CC: Fall (Right lower back, right hip and right knee pain, tripped on some bricks yesterday at 4P, no hitting, no LOC)      Based on the medical record the care appears appropriate.  I was personally available for consultation in the Emergency Department.    Herberth Meyer MD  Attending Emergency Physician                Herberth Meyer MD  05/30/25 1249